# Patient Record
(demographics unavailable — no encounter records)

---

## 2025-05-21 NOTE — HISTORY OF PRESENT ILLNESS
[Never] : never [TextBox_4] : 79-year-old female presents for evaluation of increasing BOTELLO associated with occasional productive cough and wheezing over the last 3 years.  Patient denies fever, chest pain, hemoptysis, night sweats or weight loss.  She has been treated with Trelegy over the last year and has been on Singulair for nearly 5 years with occasional albuterol both nebulized and MDI use.  She has a history of "allergies" treated with "shots" 25 years ago having stopped about 5 years ago.  She has been treated with oral steroids on occasion last used December 2024. She has GERD on PPI. Chest CT was recently performed. [TextBox_29] : Denies snoring, daytime somnolence, apneic episodes, AM headaches

## 2025-05-21 NOTE — PHYSICAL EXAM
[No Acute Distress] : no acute distress [Normal Oropharynx] : normal oropharynx [Normal Appearance] : normal appearance [No Neck Mass] : no neck mass [Normal Rate/Rhythm] : normal rate/rhythm [Normal S1, S2] : normal s1, s2 [No Murmurs] : no murmurs [No Resp Distress] : no resp distress [Clear to Auscultation Bilaterally] : clear to auscultation bilaterally [No Abnormalities] : no abnormalities [Benign] : benign [No Clubbing] : no clubbing [No Cyanosis] : no cyanosis [No Edema] : no edema [FROM] : FROM [Normal Color/ Pigmentation] : normal color/ pigmentation [No Focal Deficits] : no focal deficits [Oriented x3] : oriented x3 [Normal Affect] : normal affect [TextBox_99] : uses cane

## 2025-05-21 NOTE — CONSULT LETTER
[Dear  ___] : Dear  [unfilled], [Courtesy Letter:] : I had the pleasure of seeing your patient, [unfilled], in my office today. [Please see my note below.] : Please see my note below. [Consult Closing:] : Thank you very much for allowing me to participate in the care of this patient.  If you have any questions, please do not hesitate to contact me. [Sincerely,] : Sincerely, [[email or web address]] : TAMIKO ROBERTO MD  [address], [address] [place] [address]

## 2025-05-21 NOTE — REVIEW OF SYSTEMS
[Cough] : cough [Sputum] : sputum [SOB on Exertion] : sob on exertion [Negative] : Endocrine [Back Pain] : back pain [Chronic Pain] : chronic pain [GERD] : gerd